# Patient Record
(demographics unavailable — no encounter records)

---

## 2024-12-17 NOTE — DISCUSSION/SUMMARY
[de-identified] : Chronic left knee pain due to advanced arthritis. I discussed the treatment of degenerative arthritis with the patient at length today. I described the spectrum of treatment from nonoperative modalities to total joint arthroplasty. Noninvasive and nonoperative treatment modalities include weight reduction, activity modification with low impact exercise, PRN use of acetaminophen or anti-inflammatory medication if tolerated, glucosamine/chondroitin supplements, and physical therapy. Further treatments can include corticosteroid injection and the use of hyaluronic acid injections. Definitive treatment can certainly include total joint arthroplasty also. The risks and benefits of each treatment options was discussed and all questions were answered.  Cortisone injection to the left knee today. Followup 3 months

## 2024-12-17 NOTE — DISCUSSION/SUMMARY
[de-identified] : Chronic left knee pain due to advanced arthritis. I discussed the treatment of degenerative arthritis with the patient at length today. I described the spectrum of treatment from nonoperative modalities to total joint arthroplasty. Noninvasive and nonoperative treatment modalities include weight reduction, activity modification with low impact exercise, PRN use of acetaminophen or anti-inflammatory medication if tolerated, glucosamine/chondroitin supplements, and physical therapy. Further treatments can include corticosteroid injection and the use of hyaluronic acid injections. Definitive treatment can certainly include total joint arthroplasty also. The risks and benefits of each treatment options was discussed and all questions were answered.  Cortisone injection to the left knee today. Followup 3 months

## 2024-12-17 NOTE — PHYSICAL EXAM
[Cane] : ambulates with cane [de-identified] : General: No acute distress Mental: Alert and oriented x3 Eyes: Conjunctivitis not seen Chest: Symmetric chest rise, no audible wheezing Skin: Bilateral lower extremities absent from rashes and ulcers Abdomen: No distention  Left knee: Skin: Clean, dry, intact  Inspection: varus malalignment, no masses, no swelling, small effusion.  Tenderness: positive MJLT. no LJLT. No tenderness over the medial and lateral patella facets. No ttp medial/lateral epicondyle, patella tendon, tibial tubercle, pes anserinus, or proximal fibula.  ROM: 0 to 100  Stability: Stable to varus and valgus, partially correctable varus deformity. Negative anterior/posterior drawer.  Strength: 5/5 Q/H/TA/GS/EHL, no atrophy  Neuro: Sensation intact to light touch throughout in dp/sp/tib/carla/saph nerve distributions Pulses: 2+ DP/PT pulses.  Right knee: Skin: Clean, dry, intact  Inspection: varus malalignment, no masses, no swelling, no effusion.  Tenderness: no MJLT. no LJLT. No tenderness over the medial and lateral patella facets. No ttp medial/lateral epicondyle, patella tendon, tibial tubercle, pes anserinus, or proximal fibula.  ROM: 0 to 100 Stability: Stable to varus and valgus, negative lachman. Negative anterior/posterior drawer.  Additional tests: Negative McMurrays test, negative Steinmann, Negative patellar grind test.   Strength: 5/5 Q/H/TA/GS/EHL, no atrophy  Neuro: Sensation intact to light touch throughout in dp/sp/tib/carla/saph nerve distributions Pulses: 2+ DP/PT pulses. [de-identified] : Left knee xray with 4 views shows varus alignment, medial bone on bone arthritis, diffuse osteophytes and subchondral sclerosis, lateral tibial translation. [id number]

## 2024-12-17 NOTE — PHYSICAL EXAM
[Cane] : ambulates with cane [de-identified] : General: No acute distress Mental: Alert and oriented x3 Eyes: Conjunctivitis not seen Chest: Symmetric chest rise, no audible wheezing Skin: Bilateral lower extremities absent from rashes and ulcers Abdomen: No distention  Left knee: Skin: Clean, dry, intact  Inspection: varus malalignment, no masses, no swelling, small effusion.  Tenderness: positive MJLT. no LJLT. No tenderness over the medial and lateral patella facets. No ttp medial/lateral epicondyle, patella tendon, tibial tubercle, pes anserinus, or proximal fibula.  ROM: 0 to 100  Stability: Stable to varus and valgus, partially correctable varus deformity. Negative anterior/posterior drawer.  Strength: 5/5 Q/H/TA/GS/EHL, no atrophy  Neuro: Sensation intact to light touch throughout in dp/sp/tib/carla/saph nerve distributions Pulses: 2+ DP/PT pulses.  Right knee: Skin: Clean, dry, intact  Inspection: varus malalignment, no masses, no swelling, no effusion.  Tenderness: no MJLT. no LJLT. No tenderness over the medial and lateral patella facets. No ttp medial/lateral epicondyle, patella tendon, tibial tubercle, pes anserinus, or proximal fibula.  ROM: 0 to 100 Stability: Stable to varus and valgus, negative lachman. Negative anterior/posterior drawer.  Additional tests: Negative McMurrays test, negative Steinmann, Negative patellar grind test.   Strength: 5/5 Q/H/TA/GS/EHL, no atrophy  Neuro: Sensation intact to light touch throughout in dp/sp/tib/carla/saph nerve distributions Pulses: 2+ DP/PT pulses. [de-identified] : Left knee xray with 4 views shows varus alignment, medial bone on bone arthritis, diffuse osteophytes and subchondral sclerosis, lateral tibial translation. [id number]

## 2024-12-17 NOTE — HISTORY OF PRESENT ILLNESS
[de-identified] : Reports temporary relief from last cortisone injection. Now with pain, stiffness, swelling. Left worse then right. The pain substantially limits activities of daily living. Walking tolerance is reduced. Medication including NSAIDs and activity modification have been minimally effective for a period lasting greater than three months in duration. Assistive devices and external support were not deemed by the patient to be helpful in improving their function. She wears knee braces.

## 2024-12-17 NOTE — PROCEDURE
[de-identified] : Injection: LEFT knee joint. Indication: Arthritis   A discussion was had with the patient regarding this procedure and all questions were answered. All risks, benefits and alternatives were discussed. These included but were not limited to bleeding, infection, and allergic reaction. Alcohol was used to clean the skin, and betadine was used to sterilize and prep the area in the lateral aspect of the LEFT knee. Ethyl chloride spray was then used as a topical anesthetic. A 22-gauge needle was used to inject 4cc of 2% lidocaine and 1cc of 40mg Kenalog into the knee. A sterile bandage was then applied. The patient tolerated the procedure well and there were no complications.

## 2024-12-17 NOTE — PROCEDURE
[de-identified] : Injection: LEFT knee joint. Indication: Arthritis   A discussion was had with the patient regarding this procedure and all questions were answered. All risks, benefits and alternatives were discussed. These included but were not limited to bleeding, infection, and allergic reaction. Alcohol was used to clean the skin, and betadine was used to sterilize and prep the area in the lateral aspect of the LEFT knee. Ethyl chloride spray was then used as a topical anesthetic. A 22-gauge needle was used to inject 4cc of 2% lidocaine and 1cc of 40mg Kenalog into the knee. A sterile bandage was then applied. The patient tolerated the procedure well and there were no complications.

## 2024-12-17 NOTE — HISTORY OF PRESENT ILLNESS
[de-identified] : Reports temporary relief from last cortisone injection. Now with pain, stiffness, swelling. Left worse then right. The pain substantially limits activities of daily living. Walking tolerance is reduced. Medication including NSAIDs and activity modification have been minimally effective for a period lasting greater than three months in duration. Assistive devices and external support were not deemed by the patient to be helpful in improving their function. She wears knee braces.